# Patient Record
Sex: FEMALE | Race: WHITE | URBAN - METROPOLITAN AREA
[De-identification: names, ages, dates, MRNs, and addresses within clinical notes are randomized per-mention and may not be internally consistent; named-entity substitution may affect disease eponyms.]

---

## 2017-08-21 ENCOUNTER — OFFICE VISIT (OUTPATIENT)
Dept: FAMILY MEDICINE | Facility: OTHER | Age: 32
End: 2017-08-21
Payer: COMMERCIAL

## 2017-08-21 VITALS
TEMPERATURE: 98.6 F | SYSTOLIC BLOOD PRESSURE: 86 MMHG | RESPIRATION RATE: 16 BRPM | WEIGHT: 140 LBS | BODY MASS INDEX: 21.6 KG/M2 | HEART RATE: 90 BPM | OXYGEN SATURATION: 97 % | DIASTOLIC BLOOD PRESSURE: 52 MMHG

## 2017-08-21 DIAGNOSIS — E86.0 DEHYDRATION, MODERATE: ICD-10-CM

## 2017-08-21 DIAGNOSIS — R55 VASOVAGAL SYNCOPE: Primary | ICD-10-CM

## 2017-08-21 LAB
BASOPHILS # BLD AUTO: 0 10E9/L (ref 0–0.2)
BASOPHILS NFR BLD AUTO: 0.2 %
DIFFERENTIAL METHOD BLD: ABNORMAL
EOSINOPHIL # BLD AUTO: 0 10E9/L (ref 0–0.7)
EOSINOPHIL NFR BLD AUTO: 0.3 %
ERYTHROCYTE [DISTWIDTH] IN BLOOD BY AUTOMATED COUNT: 12.9 % (ref 10–15)
HCT VFR BLD AUTO: 39.2 % (ref 35–47)
HGB BLD-MCNC: 13.1 G/DL (ref 11.7–15.7)
LYMPHOCYTES # BLD AUTO: 0.3 10E9/L (ref 0.8–5.3)
LYMPHOCYTES NFR BLD AUTO: 4.4 %
MCH RBC QN AUTO: 28.7 PG (ref 26.5–33)
MCHC RBC AUTO-ENTMCNC: 33.4 G/DL (ref 31.5–36.5)
MCV RBC AUTO: 86 FL (ref 78–100)
MONOCYTES # BLD AUTO: 0.3 10E9/L (ref 0–1.3)
MONOCYTES NFR BLD AUTO: 4.9 %
NEUTROPHILS # BLD AUTO: 5.4 10E9/L (ref 1.6–8.3)
NEUTROPHILS NFR BLD AUTO: 90.2 %
PLATELET # BLD AUTO: 217 10E9/L (ref 150–450)
RBC # BLD AUTO: 4.57 10E12/L (ref 3.8–5.2)
WBC # BLD AUTO: 6 10E9/L (ref 4–11)

## 2017-08-21 PROCEDURE — 85004 AUTOMATED DIFF WBC COUNT: CPT | Performed by: FAMILY MEDICINE

## 2017-08-21 PROCEDURE — 99213 OFFICE O/P EST LOW 20 MIN: CPT | Performed by: FAMILY MEDICINE

## 2017-08-21 PROCEDURE — 36415 COLL VENOUS BLD VENIPUNCTURE: CPT | Performed by: FAMILY MEDICINE

## 2017-08-21 PROCEDURE — 85027 COMPLETE CBC AUTOMATED: CPT | Performed by: FAMILY MEDICINE

## 2017-08-21 RX ORDER — ONDANSETRON 4 MG/1
4-8 TABLET, ORALLY DISINTEGRATING ORAL EVERY 8 HOURS PRN
Qty: 20 TABLET | Refills: 1 | Status: SHIPPED | OUTPATIENT
Start: 2017-08-21

## 2017-08-21 ASSESSMENT — PAIN SCALES - GENERAL: PAINLEVEL: NO PAIN (0)

## 2017-08-21 NOTE — NURSING NOTE
"Chief Complaint   Patient presents with     Syncope       Initial BP (!) 86/52  Pulse 90  Temp 98.6  F (37  C) (Temporal)  Resp 16  Wt 140 lb (63.5 kg)  SpO2 97%  BMI 21.6 kg/m2 Estimated body mass index is 21.6 kg/(m^2) as calculated from the following:    Height as of 10/13/14: 5' 7.5\" (1.715 m).    Weight as of this encounter: 140 lb (63.5 kg).  Medication Reconciliation: complete   Jeannine Moody MA      "

## 2017-08-21 NOTE — LETTER
[facility]  [address]   [facility] [address]  Phone: [phone]    August 21, 2017        Lis Tamayo  [address] [facility] [address]          To whom it may concern:    RE: Lis Tamayo    Patient was seen and treated today at our clinic and missed work.  She is ill and may miss the next 1-2 days to recover.    Please contact me for questions or concerns.      Sincerely,        Kalani Pinto MD, MD

## 2017-08-21 NOTE — PROGRESS NOTES
SUBJECTIVE:                                                    Lis Tamayo is a 32 year old female who presents to clinic today for the following health issues:    HPI    Concern - Fainting symptoms   Onset: this morning     Description:   Light headiness, weak, muscle fatigue, lack of concentration, lack of being able to stand up straight     Intensity: severe    Progression of Symptoms:  same    Accompanying Signs & Symptoms:  Weak, light headiness     Previous history of similar problem:   Yes     Precipitating factors:   Worsened by: no    Alleviating factors:  Improved by: sleeping     Therapies Tried and outcome: no     NEURO: The patient reports fainting symptoms since this morning, similar to past episodes that usually occur in the summer. She has also vomited this morning.    Problem list and histories reviewed & adjusted, as indicated.  Additional history: as documented    Patient Active Problem List   Diagnosis     Papanicolaou smear of cervix with low grade squamous intraepithelial lesion (LGSIL)     CARDIOVASCULAR SCREENING; LDL GOAL LESS THAN 160     Thoracic or lumbosacral neuritis or radiculitis, unspecified     Low back pain     Past Surgical History:   Procedure Laterality Date     INJECT EPIDURAL LUMBAR  10/23/2014    [facility]       Social History   Substance Use Topics     Smoking status: Never Smoker     Smokeless tobacco: Not on file     Alcohol use Yes      Comment: rare use     Family History   Problem Relation Age of Onset     Thyroid Disease Mother      hypo     Hypertension Father      and hypercholesterol     DIABETES Maternal Grandmother       from brain aneurysm     DIABETES Maternal Grandfather      DIABETES Paternal Grandmother      DIABETES Paternal Grandfather      Hypertension Maternal Grandmother      and hypercholesterol     Hypertension Maternal Grandfather      and hypercholesterol     Hypertension Paternal Grandmother      and hypercholesterol      Hypertension Paternal Grandfather      and hypercholesterol     Breast Cancer Maternal Grandmother              ROS:  Constitutional, HEENT, cardiovascular, pulmonary, GI, , musculoskeletal, neuro, skin, endocrine and psych systems are negative, except as in HPI or otherwise noted     This document serves as a record of the services and decisions personally performed and made by Kalani Pinto MD. It was created on her behalf by Alma Gatica , a trained medical scribe. The creation of this document is based the provider's statements to the medical scribe.  Alma Gatica, August 21, 2017 11:43 AM     OBJECTIVE:                                                    BP (!) 86/52  Pulse 90  Temp 98.6  F (37  C) (Temporal)  Resp 16  Wt 63.5 kg (140 lb)  SpO2 97%  BMI 21.6 kg/m2  Body mass index is 21.6 kg/(m^2).   GENERAL: healthy, alert, well nourished, no distress  NOSE: Dried mucous membranes.  RESP: lungs clear to auscultation - no rales, no rhonchi, no wheezes  CV: Delayed capillary refill. regular rates and rhythm, normal S1 S2, no S3 or S4 and no murmur, no click or rub -  MS: extremities- no gross deformities noted, no edema  ABD: upper epigastric area discomfort.  SKIN: no suspicious lesions, no rashes  PSYCH: Alert and oriented times 3; speech- coherent , normal rate and volume; able to articulate logical thoughts, able to abstract reason, no tangential thoughts, no hallucinations or delusions, affect- normal    Results for orders placed or performed in visit on 08/21/17 (from the past 24 hour(s))   CBC with platelets   Result Value Ref Range    WBC 6.0 4.0 - 11.0 10e9/L    RBC Count 4.57 3.8 - 5.2 10e12/L    Hemoglobin 13.1 11.7 - 15.7 g/dL    Hematocrit 39.2 35.0 - 47.0 %    MCV 86 78 - 100 fl    MCH 28.7 26.5 - 33.0 pg    MCHC 33.4 31.5 - 36.5 g/dL    RDW 12.9 10.0 - 15.0 %    Platelet Count 217 150 - 450 10e9/L   WBC Differential   Result Value Ref Range    Diff Method Automated Method     % Neutrophils  90.2 %    % Lymphocytes 4.4 %    % Monocytes 4.9 %    % Eosinophils 0.3 %    % Basophils 0.2 %    Absolute Neutrophil 5.4 1.6 - 8.3 10e9/L    Absolute Lymphocytes 0.3 (L) 0.8 - 5.3 10e9/L    Absolute Monocytes 0.3 0.0 - 1.3 10e9/L    Absolute Eosinophils 0.0 0.0 - 0.7 10e9/L    Absolute Basophils 0.0 0.0 - 0.2 10e9/L        ASSESSMENT/PLAN:                                                        ICD-10-CM    1. Vasovagal syncope R55 CBC with platelets     WBC Differential   2. Dehydration, moderate E86.0 CBC with platelets     ondansetron (ZOFRAN ODT) 4 MG ODT tab          -Low blood pressure and blood volume today.  Labs ordered; results show likely viral gastroenteritis. Advised proper hydration. Work note given.  Had vomiting times 1 this AM and still feels a little nauseated.  Will give zofran to help improve fluids as moderately dehydrated which likely is from poor intake per patient and is breastfeeding.  May have viral gastroenteritis contributing due to high neutrophil count with this.    Patient Instructions   -Stay hydrated.      The information in this document, created by the medical scribe for me, accurately reflects the services I personally performed and the decisions made by me. I have reviewed and approved this document for accuracy.   MD Kalani Livingston MD, MD  [facility]

## 2017-08-21 NOTE — MR AVS SNAPSHOT
"              After Visit Summary   2017    Lis Tamayo    MRN: [id number]           Patient Information     Date Of Birth          1985        Visit Information        Provider Department      2017 11:45 AM Kalani Pinto MD [facility]        Today's Diagnoses     Vasovagal syncope    -  1    Dehydration, moderate          Care Instructions    -Stay hydrated.          Follow-ups after your visit        Who to contact     If you have questions or need follow up information about today's clinic visit or your schedule please contact [facility] directly at [phone].  Normal or non-critical lab and imaging results will be communicated to you by [employer], letter or phone within 4 business days after the clinic has received the results. If you do not hear from us within 7 days, please contact the clinic through [employer] or phone. If you have a critical or abnormal lab result, we will notify you by phone as soon as possible.  Submit refill requests through [employer] or call your pharmacy and they will forward the refill request to us. Please allow 3 business days for your refill to be completed.          Additional Information About Your Visit        MyChart Information     [email or web address] lets you send messages to your doctor, view your test results, renew your prescriptions, schedule appointments and more. To sign up, go to [email or web address] . Click on \"Log in\" on the left side of the screen, which will take you to the Welcome page. Then click on \"Sign up Now\" on the right side of the page.     You will be asked to enter the access code listed below, as well as some personal information. Please follow the directions to create your username and password.     Your access code is: [email or web address]  Expires: 2017 12:43 PM     Your access code will  in 90 days. If you need help or a new code, please call your [facility] or [phone].        Care EveryWhere ID  "    This is your Care EveryWhere ID. This could be used by other organizations to access your [place] medical records  [id number]        Your Vitals Were     Pulse Temperature Respirations Pulse Oximetry BMI (Body Mass Index)       90 98.6  F (37  C) (Temporal) 16 97% 21.6 kg/m2        Blood Pressure from Last 3 Encounters:   08/21/17 (!) 86/52   11/14/11 130/80   11/05/10 152/110    Weight from Last 3 Encounters:   08/21/17 140 lb (63.5 kg)   10/13/14 148 lb (67.1 kg)   11/14/11 147 lb (66.7 kg)              We Performed the Following     CBC with platelets     WBC Differential          Today's Medication Changes          These changes are accurate as of: 8/21/17 12:43 PM.  If you have any questions, ask your nurse or doctor.               Start taking these medicines.        Dose/Directions    ondansetron 4 MG ODT tab   Commonly known as:  ZOFRAN ODT   Used for:  Dehydration, moderate        Dose:  4-8 mg   Take 1-2 tablets (4-8 mg) by mouth every 8 hours as needed for nausea   Quantity:  20 tablet   Refills:  1            Where to get your medicines      These medications were sent to [facility] [place] [id number]     Phone:  [phone]     ondansetron 4 MG ODT tab                Primary Care Provider Office Phone # Fax #    Kalani Pinto [phone]       [address] [place] [address]        Equal Access to Services     LORENE COSBY : Hadii tanvir almazano Sosabrina, waaxda luqadaha, qaybta kaalmada margretyada, waxmeagan narinder pérez. So [facility] [phone].    ATENCIÓN: Si habla español, tiene a kincaid disposición servicios gratuitos de asistencia lingüística. Llame al [phone].    We comply with applicable federal civil rights laws and [place] laws. We do not discriminate on the basis of race, color, national origin, age, disability sex, sexual orientation or gender identity.            Thank you!      Thank you for choosing [facility]  for your care. Our goal is always to provide you with excellent care. Hearing back from our patients is one way we can continue to improve our services. Please take a few minutes to complete the written survey that you may receive in the mail after your visit with us. Thank you!             Your Updated Medication List - Protect others around you: Learn how to safely use, store and throw away your medicines at www.disposemymeds.org.          This list is accurate as of: 8/21/17 12:43 PM.  Always use your most recent med list.                   Brand Name Dispense Instructions for use Diagnosis    HYDROcodone-acetaminophen 5-325 MG per tablet    NORCO    40 tablet    Take 1 tablet by mouth every 6 hours as needed for moderate to severe pain    Lumbago       ondansetron 4 MG ODT tab    ZOFRAN ODT    20 tablet    Take 1-2 tablets (4-8 mg) by mouth every 8 hours as needed for nausea    Dehydration, moderate       PREDNISONE PO      Take 40 mg by mouth daily        PRENAT [id number] PO           ZANAFLEX 4 MG capsule   Generic drug:  tiZANidine      Take 4 mg by mouth 3 times daily

## 2017-09-21 ENCOUNTER — TELEPHONE (OUTPATIENT)
Dept: FAMILY MEDICINE | Facility: OTHER | Age: 32
End: 2017-09-21

## 2017-09-21 NOTE — TELEPHONE ENCOUNTER
Summary:    Patient is due/failing the following:   PAP    Action needed:   Patient needs office visit for PAP.    Type of outreach:    Phone, left message for patient to call back.     Questions for provider review:    None                                                                                                                                    Letty Gayle       Chart routed to Care Team .        Panel Management Review      Patient has the following on her problem list: None      Composite cancer screening  Chart review shows that this patient is due/due soon for the following Pap Smeari,

## 2017-09-21 NOTE — LETTER
[facility]  [address]   [facility] [address]  Phone: [phone]  September 27, 2017      Lis Tamayo  [address]  [facility] [address]      Dear Lis,    We care about your health and have reviewed your health plan including your medical conditions, medications, and lab results.  Based on this review, it is recommended that you follow up regarding the following health topic(s):  -Cervical Cancer Screening    We recommend you take the following action(s):  -schedule a PAP SMEAR EXAM which is due.  Please disregard this reminder if you have had this exam elsewhere within the last year.  It would be helpful for us to have a copy of your recent pap smear report to update your records.     Please call us at the [facility] - [phone] (or use [employer]YMION) to address the above recommendations.     Thank you for trusting [facility] and we appreciate the opportunity to serve you.  We look forward to supporting your healthcare needs in the future.    Healthy Regards,    Your Health Care Team  [facility]

## 2017-12-28 ENCOUNTER — TELEPHONE (OUTPATIENT)
Dept: FAMILY MEDICINE | Facility: OTHER | Age: 32
End: 2017-12-28

## 2017-12-28 NOTE — TELEPHONE ENCOUNTER
Summary:    Patient is due/failing the following:   PAP    Action needed:   Patient needs office visit for PAP.    Type of outreach:    Phone, left message for patient to call back.     Questions for provider review:    None                                                                                                                                    Letty Gayle       Chart routed to Care Team .        Panel Management Review      Patient has the following on her problem list: None      Composite cancer screening  Chart review shows that this patient is due/due soon for the following Pap Smear

## 2018-02-14 ENCOUNTER — OFFICE VISIT (OUTPATIENT)
Dept: FAMILY MEDICINE | Facility: OTHER | Age: 33
End: 2018-02-14
Payer: COMMERCIAL

## 2018-02-14 VITALS
RESPIRATION RATE: 18 BRPM | OXYGEN SATURATION: 99 % | BODY MASS INDEX: 21.45 KG/M2 | DIASTOLIC BLOOD PRESSURE: 62 MMHG | HEART RATE: 88 BPM | TEMPERATURE: 98.8 F | WEIGHT: 139 LBS | SYSTOLIC BLOOD PRESSURE: 110 MMHG

## 2018-02-14 DIAGNOSIS — J01.00 ACUTE MAXILLARY SINUSITIS, RECURRENCE NOT SPECIFIED: Primary | ICD-10-CM

## 2018-02-14 DIAGNOSIS — R05.9 COUGH: ICD-10-CM

## 2018-02-14 PROCEDURE — 99213 OFFICE O/P EST LOW 20 MIN: CPT | Performed by: PHYSICIAN ASSISTANT

## 2018-02-14 RX ORDER — PREDNISONE 20 MG/1
20 TABLET ORAL DAILY
Qty: 5 TABLET | Refills: 0 | Status: SHIPPED | OUTPATIENT
Start: 2018-02-14

## 2018-02-14 NOTE — MR AVS SNAPSHOT
After Visit Summary   2/14/2018    Lis Tamayo    MRN: [id number]           Patient Information     Date Of Birth          1985        Visit Information        Provider Department      2/14/2018 2:15 PM Cornelius Olmstead PA-C [facility]        Today's Diagnoses     Acute maxillary sinusitis, recurrence not specified    -  1    Cough          Care Instructions    Will prescribe an antibiotic Augmentin called to take twice daily for 10 days. Take a daily probiotic or Activia yogurt while you are on this.  Will also prescribe a steroid to take once daily for 5 days to help with the sinus congestion and ear pressure.  Drink plenty of fluids.  Can use an over the counter Nettipot or sinus rinse to help with nasal congestion. Mucinex can also be helpful.   I also recommend Flonase to help with nasal swelling.  Follow up if symptoms are not improving within the next week.             Follow-ups after your visit        Your next 10 appointments already scheduled     Feb 14, 2018  2:15 PM CST   SHORT with Cornelius Olmstead PA-C   [facility] ([facility])    [address] [place] [id number] [phone]              Who to contact     If you have questions or need follow up information about today's clinic visit or your schedule please contact [facility] directly at [phone].  Normal or non-critical lab and imaging results will be communicated to you by MyChart, letter or phone within 4 business days after the clinic has received the results. If you do not hear from us within 7 days, please contact the clinic through MyChart or phone. If you have a critical or abnormal lab result, we will notify you by phone as soon as possible.  Submit refill requests through [employer] or call your pharmacy and they will forward the refill request to us. Please allow 3 business days for your refill to be completed.           "Additional Information About Your Visit        MyChart Information     [employer] lets you send messages to your doctor, view your test results, renew your prescriptions, schedule appointments and more. To sign up, go to [email or web address] . Click on \"Log in\" on the left side of the screen, which will take you to the Welcome page. Then click on \"Sign up Now\" on the right side of the page.     You will be asked to enter the access code listed below, as well as some personal information. Please follow the directions to create your username and password.     Your access code is: [email or web address]  Expires: 5/15/2018  1:59 PM     Your access code will  in 90 days. If you need help or a new code, please call your [place] clinic or [phone].        Care EveryWhere ID     This is your Care EveryWhere ID. This could be used by other organizations to access your [place] medical records  [id number]        Your Vitals Were     Pulse Temperature Respirations Pulse Oximetry BMI (Body Mass Index)       88 98.8  F (37.1  C) (Temporal) 18 99% 21.45 kg/m2        Blood Pressure from Last 3 Encounters:   18 110/62   17 (!) 86/52   11 130/80    Weight from Last 3 Encounters:   18 139 lb (63 kg)   17 140 lb (63.5 kg)   10/13/14 148 lb (67.1 kg)              Today, you had the following     No orders found for display         Today's Medication Changes          These changes are accurate as of 18  1:59 PM.  If you have any questions, ask your nurse or doctor.               Start taking these medicines.        Dose/Directions    amoxicillin-clavulanate 875-125 MG per tablet   Commonly known as:  AUGMENTIN   Used for:  Acute maxillary sinusitis, recurrence not specified, Cough   Started by:  Cornelius Olmstead PA-C        Dose:  1 tablet   Take 1 tablet by mouth 2 times daily   Quantity:  20 tablet   Refills:  0       predniSONE 20 MG tablet   Commonly known as:  DELTASONE   Used for:  Acute " maxillary sinusitis, recurrence not specified, Cough   Started by:  Cornelius Olmstead PA-C        Dose:  20 mg   Take 1 tablet (20 mg) by mouth daily   Quantity:  5 tablet   Refills:  0         Stop taking these medicines if you haven't already. Please contact your care team if you have questions.     HYDROcodone-acetaminophen 5-325 MG per tablet   Commonly known as:  NORCO   Stopped by:  Cornelius Olmstead PA-C                Where to get your medicines      These medications were sent to [facility] [facility] [address] [address]  [address] [address] [address]     Phone:  [phone]     amoxicillin-clavulanate 875-125 MG per tablet    predniSONE 20 MG tablet                Primary Care Provider Office Phone # Fax #    Kalani Pinto [phone]       [address] [place] [address]        Equal Access to Services     [facility]: Hadii tanvir almazano Sosabrina, waaxda luqadaha, qaybta kaalmada margretyaestefany, oliva drake . So [facility] [phone].    ATENCIÓN: Si habla español, tiene a kincaid disposición servicios gratuitos de asistencia lingüística. Kristipaul al [phone].    We comply with applicable federal civil rights laws and [place] laws. We do not discriminate on the basis of race, color, national origin, age, disability, sex, sexual orientation, or gender identity.            Thank you!     Thank you for choosing [facility]  for your care. Our goal is always to provide you with excellent care. Hearing back from our patients is one way we can continue to improve our services. Please take a few minutes to complete the written survey that you may receive in the mail after your visit with us. Thank you!             Your Updated Medication List - Protect others around you: Learn how to safely use, store and throw away your medicines at www.disposemymeds.org.          This list is accurate as of 2/14/18  1:59 PM.  Always  use your most recent med list.                   Brand Name Dispense Instructions for use Diagnosis    amoxicillin-clavulanate 875-125 MG per tablet    AUGMENTIN    20 tablet    Take 1 tablet by mouth 2 times daily    Acute maxillary sinusitis, recurrence not specified, Cough       ondansetron 4 MG ODT tab    ZOFRAN ODT    20 tablet    Take 1-2 tablets (4-8 mg) by mouth every 8 hours as needed for nausea    Dehydration, moderate       predniSONE 20 MG tablet    DELTASONE    5 tablet    Take 1 tablet (20 mg) by mouth daily    Acute maxillary sinusitis, recurrence not specified, Cough       DAVID PLUNKETT-FA-DHA PO           ZANAFLEX 4 MG capsule   Generic drug:  tiZANidine      Take 4 mg by mouth 3 times daily

## 2018-02-14 NOTE — PATIENT INSTRUCTIONS
Will prescribe an antibiotic Augmentin called to take twice daily for 10 days. Take a daily probiotic or Activia yogurt while you are on this.  Will also prescribe a steroid to take once daily for 5 days to help with the sinus congestion and ear pressure.  Drink plenty of fluids.  Can use an over the counter Nettipot or sinus rinse to help with nasal congestion. Mucinex can also be helpful.   I also recommend Flonase to help with nasal swelling.  Follow up if symptoms are not improving within the next week.

## 2018-02-14 NOTE — NURSING NOTE
"Chief Complaint   Patient presents with     Sinus Problem       Initial /62 (BP Location: Right arm, Patient Position: Chair, Cuff Size: Adult Regular)  Pulse 88  Temp 98.8  F (37.1  C) (Temporal)  Resp 18  Wt 139 lb (63 kg)  SpO2 99%  BMI 21.45 kg/m2 Estimated body mass index is 21.45 kg/(m^2) as calculated from the following:    Height as of 10/13/14: 5' 7.5\" (1.715 m).    Weight as of this encounter: 139 lb (63 kg).  Medication Reconciliation: complete     Ingrid Mancera CMA      "

## 2018-02-14 NOTE — PROGRESS NOTES
"  SUBJECTIVE:   Lis Tamayo is a 33 year old female who presents to clinic today for the following health issues:      HPI     Acute Illness   Acute illness concerns: sinus  Onset: thursday    Fever: no    Chills/Sweats: YES- chills    Headache (location?): YES- \"thats' normal though\"    Sinus Pressure:YES- post-nasal drainage and facial pain    Conjunctivitis:  No - daughter has it    Ear Pain: YES- \"plugged\"    Rhinorrhea: YES    Congestion: YES    Sore Throat: YES     Cough: YES-non-productive    Wheeze: YES    Decreased Appetite: YES    Nausea: no - \"when bending over, she gets nauseated\"    Vomiting: no    Diarrhea:  no    Dysuria/Freq.: no    Fatigue/Achiness: YES- fatigue    Sick/Strep Exposure: YES- daughter     Therapies Tried and outcome: Advil cold and sinus    Lower rib tenderness from coughing, mild wheezing.     Problem list and histories reviewed & adjusted, as indicated.  Additional history: none    ROS:  GENERAL: Denies fever, fatigue, weakness, weight gain, or weight loss.  HEENT: Eyes-Denies pain, redness, loss of vision, double or blurred vision.     Ears/Nose- +Sinus congestion and pain, nasal drainage, sore throat. Denies tinnitus, loss of hearing, epistaxis, decreased sense of smell. Denies loss of sense of taste, dry mouth.  CARDIOVASCULAR: Denies chest pain, shortness of breath, irregular heartbeats,  palpitations, or edema.  RESPIRATORY: +Dry cough. Denies hemoptysis and shortness of breath.    OBJECTIVE:     /62 (BP Location: Right arm, Patient Position: Chair, Cuff Size: Adult Regular)  Pulse 88  Temp 98.8  F (37.1  C) (Temporal)  Resp 18  Wt 139 lb (63 kg)  SpO2 99%  BMI 21.45 kg/m2  Body mass index is 21.45 kg/(m^2).  GENERAL: healthy, alert and no distress  EYES: Eyes grossly normal to inspection, PERRL and conjunctivae and sclerae normal  HENT: ear canals and TM's normal, nose and mouth without ulcers or lesions. Nasal mucosa is erythematous with whitish, clear " drainage. Bilateral maxillary sinus tenderness.   NECK: no adenopathy, no asymmetry, masses, or scars and thyroid normal to palpation  RESP: lungs clear to auscultation - no rales, rhonchi or wheezes  CV: regular rate and rhythm, normal S1 S2, no S3 or S4, no murmur, click or rub    ASSESSMENT/PLAN:       ICD-10-CM    1. Acute maxillary sinusitis, recurrence not specified J01.00 amoxicillin-clavulanate (AUGMENTIN) 875-125 MG per tablet     predniSONE (DELTASONE) 20 MG tablet   2. Cough R05 amoxicillin-clavulanate (AUGMENTIN) 875-125 MG per tablet     predniSONE (DELTASONE) 20 MG tablet         Will prescribe Augmentin to take twice daily for 10 days. Instructed to take a daily probiotic or Activia yogurt while on this.  Will also prescribe prednisone to take once daily for 5 days to help with the sinus congestion and wheezing. Encouraged to avoid breast feeding and instead pump while on this. She states she has a large supply of breast milk stored so this will not be a problem.   Drink plenty of fluids.  Can use an over the counter Nettipot or sinus rinse to help with nasal congestion. Mucinex can also be helpful.   I also recommend Flonase to help with nasal swelling.  Follow up if symptoms are not improving within the next week.       Cornelius Olmstead PA-C  [facility]

## 2019-02-26 ENCOUNTER — TELEPHONE (OUTPATIENT)
Dept: FAMILY MEDICINE | Facility: OTHER | Age: 34
End: 2019-02-26

## 2019-02-26 NOTE — LETTER
[facility]  [address]   [place] [address]  Phone: [phone]  February 27, 2019      Lis Tamayo  [address]  [place] [address]      Dear Lis,    We care about your health and have reviewed your health plan including your medical conditions, medications, and lab results.  Based on this review, it is recommended that you follow up regarding the following health topic(s):  -Cervical Cancer Screening    We recommend you take the following action(s):  -schedule a PAP SMEAR EXAM which is due.  Please disregard this reminder if you have had this exam elsewhere within the last year.  It would be helpful for us to have a copy of your recent pap smear report to update your records.     Please call us at the [facility] - [phone] (or use [employer]) to address the above recommendations.     Thank you for trusting [facility] and we appreciate the opportunity to serve you.  We look forward to supporting your healthcare needs in the future.    Healthy Regards,    Your Health Care Team  [facility]